# Patient Record
Sex: FEMALE | Race: WHITE | Employment: UNEMPLOYED | URBAN - METROPOLITAN AREA
[De-identification: names, ages, dates, MRNs, and addresses within clinical notes are randomized per-mention and may not be internally consistent; named-entity substitution may affect disease eponyms.]

---

## 2017-08-03 ENCOUNTER — APPOINTMENT (OUTPATIENT)
Dept: GENERAL RADIOLOGY | Age: 42
End: 2017-08-03
Attending: EMERGENCY MEDICINE
Payer: SELF-PAY

## 2017-08-03 ENCOUNTER — HOSPITAL ENCOUNTER (EMERGENCY)
Age: 42
Discharge: ANOTHER HOSPITAL | End: 2017-08-03
Attending: EMERGENCY MEDICINE
Payer: SELF-PAY

## 2017-08-03 ENCOUNTER — APPOINTMENT (OUTPATIENT)
Dept: CT IMAGING | Age: 42
End: 2017-08-03
Attending: EMERGENCY MEDICINE
Payer: SELF-PAY

## 2017-08-03 VITALS
DIASTOLIC BLOOD PRESSURE: 67 MMHG | HEART RATE: 110 BPM | SYSTOLIC BLOOD PRESSURE: 110 MMHG | HEIGHT: 66 IN | WEIGHT: 165 LBS | RESPIRATION RATE: 11 BRPM | OXYGEN SATURATION: 98 % | BODY MASS INDEX: 26.52 KG/M2

## 2017-08-03 DIAGNOSIS — R41.82 ALTERED MENTAL STATUS, UNSPECIFIED: ICD-10-CM

## 2017-08-03 DIAGNOSIS — I60.11 SUBARACHNOID HEMORRHAGE FROM ANEURYSM OF RIGHT MIDDLE CEREBRAL ARTERY (HCC): ICD-10-CM

## 2017-08-03 DIAGNOSIS — I60.9 SAH (SUBARACHNOID HEMORRHAGE) (HCC): Primary | ICD-10-CM

## 2017-08-03 LAB
ALBUMIN SERPL BCP-MCNC: 3.5 G/DL (ref 3.5–5)
ALBUMIN/GLOB SERPL: 1.1 {RATIO} (ref 1.2–3.5)
ALP SERPL-CCNC: 51 U/L (ref 50–136)
ALT SERPL-CCNC: 36 U/L (ref 12–65)
ANION GAP BLD CALC-SCNC: 8 MMOL/L (ref 7–16)
APTT PPP: 23.8 SEC (ref 23.5–31.7)
APTT PPP: 25.8 SEC (ref 23.5–31.7)
AST SERPL W P-5'-P-CCNC: 29 U/L (ref 15–37)
BILIRUB SERPL-MCNC: 0.3 MG/DL (ref 0.2–1.1)
BUN SERPL-MCNC: 8 MG/DL (ref 6–23)
CALCIUM SERPL-MCNC: 8.5 MG/DL (ref 8.3–10.4)
CHLORIDE SERPL-SCNC: 106 MMOL/L (ref 98–107)
CO2 SERPL-SCNC: 27 MMOL/L (ref 21–32)
CREAT SERPL-MCNC: 0.74 MG/DL (ref 0.6–1)
GLOBULIN SER CALC-MCNC: 3.2 G/DL (ref 2.3–3.5)
GLUCOSE SERPL-MCNC: 132 MG/DL (ref 65–100)
INR BLD: 1 (ref 0.9–1.2)
INR PPP: 1 (ref 0.9–1.2)
POTASSIUM SERPL-SCNC: 3 MMOL/L (ref 3.5–5.1)
PROT SERPL-MCNC: 6.7 G/DL (ref 6.3–8.2)
PROTHROMBIN TIME: 10.6 SEC (ref 9.6–12)
PT BLD: 12.5 SECS (ref 9.6–11.6)
SODIUM SERPL-SCNC: 141 MMOL/L (ref 136–145)

## 2017-08-03 PROCEDURE — 74011000258 HC RX REV CODE- 258: Performed by: EMERGENCY MEDICINE

## 2017-08-03 PROCEDURE — 80051 ELECTROLYTE PANEL: CPT

## 2017-08-03 PROCEDURE — 99285 EMERGENCY DEPT VISIT HI MDM: CPT | Performed by: EMERGENCY MEDICINE

## 2017-08-03 PROCEDURE — 80307 DRUG TEST PRSMV CHEM ANLYZR: CPT | Performed by: EMERGENCY MEDICINE

## 2017-08-03 PROCEDURE — 74011636320 HC RX REV CODE- 636/320: Performed by: EMERGENCY MEDICINE

## 2017-08-03 PROCEDURE — 96367 TX/PROPH/DG ADDL SEQ IV INF: CPT | Performed by: EMERGENCY MEDICINE

## 2017-08-03 PROCEDURE — 71010 XR CHEST SNGL V: CPT

## 2017-08-03 PROCEDURE — 85610 PROTHROMBIN TIME: CPT | Performed by: EMERGENCY MEDICINE

## 2017-08-03 PROCEDURE — 74011250636 HC RX REV CODE- 250/636: Performed by: EMERGENCY MEDICINE

## 2017-08-03 PROCEDURE — 84484 ASSAY OF TROPONIN QUANT: CPT

## 2017-08-03 PROCEDURE — 74011000250 HC RX REV CODE- 250: Performed by: EMERGENCY MEDICINE

## 2017-08-03 PROCEDURE — 70496 CT ANGIOGRAPHY HEAD: CPT

## 2017-08-03 PROCEDURE — 83605 ASSAY OF LACTIC ACID: CPT

## 2017-08-03 PROCEDURE — 96368 THER/DIAG CONCURRENT INF: CPT | Performed by: EMERGENCY MEDICINE

## 2017-08-03 PROCEDURE — 85610 PROTHROMBIN TIME: CPT

## 2017-08-03 PROCEDURE — 96366 THER/PROPH/DIAG IV INF ADDON: CPT | Performed by: EMERGENCY MEDICINE

## 2017-08-03 PROCEDURE — 96375 TX/PRO/DX INJ NEW DRUG ADDON: CPT | Performed by: EMERGENCY MEDICINE

## 2017-08-03 PROCEDURE — 80053 COMPREHEN METABOLIC PANEL: CPT | Performed by: EMERGENCY MEDICINE

## 2017-08-03 PROCEDURE — 31500 INSERT EMERGENCY AIRWAY: CPT | Performed by: EMERGENCY MEDICINE

## 2017-08-03 PROCEDURE — 36600 WITHDRAWAL OF ARTERIAL BLOOD: CPT

## 2017-08-03 PROCEDURE — 82803 BLOOD GASES ANY COMBINATION: CPT

## 2017-08-03 PROCEDURE — 70450 CT HEAD/BRAIN W/O DYE: CPT

## 2017-08-03 PROCEDURE — 85730 THROMBOPLASTIN TIME PARTIAL: CPT | Performed by: EMERGENCY MEDICINE

## 2017-08-03 PROCEDURE — 85025 COMPLETE CBC W/AUTO DIFF WBC: CPT | Performed by: EMERGENCY MEDICINE

## 2017-08-03 PROCEDURE — 94002 VENT MGMT INPAT INIT DAY: CPT

## 2017-08-03 PROCEDURE — 96365 THER/PROPH/DIAG IV INF INIT: CPT | Performed by: EMERGENCY MEDICINE

## 2017-08-03 PROCEDURE — 74011250637 HC RX REV CODE- 250/637: Performed by: EMERGENCY MEDICINE

## 2017-08-03 RX ORDER — ETOMIDATE 2 MG/ML
20 INJECTION INTRAVENOUS ONCE
Status: COMPLETED | OUTPATIENT
Start: 2017-08-03 | End: 2017-08-03

## 2017-08-03 RX ORDER — PROPOFOL 10 MG/ML
5-50 VIAL (ML) INTRAVENOUS
Status: DISCONTINUED | OUTPATIENT
Start: 2017-08-03 | End: 2017-08-03 | Stop reason: HOSPADM

## 2017-08-03 RX ORDER — NICARDIPINE HYDROCHLORIDE 0.1 MG/ML
5 INJECTION INTRAVENOUS
Status: DISCONTINUED | OUTPATIENT
Start: 2017-08-03 | End: 2017-08-03 | Stop reason: HOSPADM

## 2017-08-03 RX ORDER — MANNITOL 20 G/100ML
50 INJECTION, SOLUTION INTRAVENOUS ONCE
Status: COMPLETED | OUTPATIENT
Start: 2017-08-03 | End: 2017-08-03

## 2017-08-03 RX ORDER — SUCCINYLCHOLINE CHLORIDE 20 MG/ML
100 INJECTION INTRAMUSCULAR; INTRAVENOUS
Status: COMPLETED | OUTPATIENT
Start: 2017-08-03 | End: 2017-08-03

## 2017-08-03 RX ORDER — SODIUM CHLORIDE 0.9 % (FLUSH) 0.9 %
10 SYRINGE (ML) INJECTION
Status: COMPLETED | OUTPATIENT
Start: 2017-08-03 | End: 2017-08-03

## 2017-08-03 RX ORDER — FENTANYL CITRATE 50 UG/ML
50 INJECTION, SOLUTION INTRAMUSCULAR; INTRAVENOUS ONCE
Status: COMPLETED | OUTPATIENT
Start: 2017-08-03 | End: 2017-08-03

## 2017-08-03 RX ORDER — FENTANYL CITRATE 50 UG/ML
50 INJECTION, SOLUTION INTRAMUSCULAR; INTRAVENOUS ONCE
Status: DISCONTINUED | OUTPATIENT
Start: 2017-08-03 | End: 2017-08-03 | Stop reason: HOSPADM

## 2017-08-03 RX ORDER — DEXAMETHASONE SODIUM PHOSPHATE 100 MG/10ML
10 INJECTION INTRAMUSCULAR; INTRAVENOUS
Status: COMPLETED | OUTPATIENT
Start: 2017-08-03 | End: 2017-08-03

## 2017-08-03 RX ORDER — NIMODIPINE 30 MG/1
60 CAPSULE, LIQUID FILLED ORAL ONCE
Status: COMPLETED | OUTPATIENT
Start: 2017-08-03 | End: 2017-08-03

## 2017-08-03 RX ADMIN — NICARDIPINE HYDROCHLORIDE 5 MG/HR: 0.1 INJECTION, SOLUTION INTRAVENOUS at 16:52

## 2017-08-03 RX ADMIN — FENTANYL CITRATE 50 MCG: 50 INJECTION, SOLUTION INTRAMUSCULAR; INTRAVENOUS at 18:17

## 2017-08-03 RX ADMIN — IOPAMIDOL 80 ML: 755 INJECTION, SOLUTION INTRAVENOUS at 18:25

## 2017-08-03 RX ADMIN — NIMODIPINE 60 MG: 30 CAPSULE, LIQUID FILLED ORAL at 17:11

## 2017-08-03 RX ADMIN — LEVETIRACETAM 1000 MG: 100 INJECTION, SOLUTION INTRAVENOUS at 18:39

## 2017-08-03 RX ADMIN — MANNITOL 50 G: 20 INJECTION, SOLUTION INTRAVENOUS at 19:33

## 2017-08-03 RX ADMIN — SUCCINYLCHOLINE CHLORIDE 100 MG: 20 INJECTION, SOLUTION INTRAMUSCULAR; INTRAVENOUS at 16:52

## 2017-08-03 RX ADMIN — SODIUM CHLORIDE 100 ML: 900 INJECTION, SOLUTION INTRAVENOUS at 18:25

## 2017-08-03 RX ADMIN — ETOMIDATE 20 MG: 2 INJECTION, SOLUTION INTRAVENOUS at 16:53

## 2017-08-03 RX ADMIN — Medication 10 ML: at 18:26

## 2017-08-03 RX ADMIN — FENTANYL CITRATE 50 MCG: 50 INJECTION, SOLUTION INTRAMUSCULAR; INTRAVENOUS at 20:02

## 2017-08-03 RX ADMIN — DEXAMETHASONE SODIUM PHOSPHATE 10 MG: 10 INJECTION INTRAMUSCULAR; INTRAVENOUS at 17:22

## 2017-08-03 RX ADMIN — PROPOFOL 10 MCG/KG/MIN: 10 INJECTION, EMULSION INTRAVENOUS at 16:33

## 2017-08-03 NOTE — ED NOTES
Pt lifting arm, attempting to pull out tube. Restraints applied to upper extremities for medical purposes. Restraints not applied for behavior. Order per MD placed.

## 2017-08-03 NOTE — PROGRESS NOTES
Patient was intubated with a number 7.5 ET Tube. Tube placement verified by auscultation, by CXR and ETCO2 monitor. ET Tube is secured at the 23 cm sandhya at the lip and on the bilateral side. Patient was intubated by Dr. Leo Blake on the 1 attempt. Breath sounds are crackles. Patient is Negative for subcutaneous air and chest excursion is symmetric. Trachea is midline. Patient is also Negative for cyanosis and is Negative for pitting edema. Patient placed on ventilator on documented settings. All alarms are set and audible. Resuscitation bag is at the head of the bed. Ventilator Settings  Mode FIO2 Rate Tidal Volume Pressure PEEP I:E Ratio   PRVC  40 %    450 ml     8 cm H20  1:2      Peak airway pressure: 22 cm H2O   Minute ventilation: 12.2 l/min     ABG: No results for input(s): PH, PCO2, PO2, HCO3 in the last 72 hours.

## 2017-08-03 NOTE — ED NOTES
Report given to Patricia Landaverde RN, receiving nurse at [facility] ED at this time. Opportunity for questions and clarifications given. Pt to be transferred by [facility]. Pt remains on ventilator and IV infusions continuing on transfer.

## 2017-08-03 NOTE — ED NOTES
Toan Cody MD, RT and primary RN + 2 more RN at bedside   1621 meds given by Bridger Lopez rn   1622 CT called at this time by CEDRIC sam to clear table for CT scan   1623 pt intubated, positive color change on CO2 monitor.  Intubation successful at this time

## 2017-08-03 NOTE — ED TRIAGE NOTES
Pt arrived via EMS for syncopal episode. EMS states pt had seizure-like activity upon arrival that lasted ~30 seconds. EMS states left sided gaze after episode. Pt would only respond to painful stimuli. Non rebreather placed and pt woke up enough to tell EMS her first name. Pt given 5 mg versed by EMS for seizure like activity. VS from EMS /120, HR 80s, , pt has no medical history. Upon arrival, pt awake but not oriented to time, place, person. Pt could sit up and move around but did not follow commands. Pt then started to display agonal breathing and posturing. MD at bedside. Intubation initiated.

## 2017-08-03 NOTE — ED PROVIDER NOTES
HPI Comments: 28-year-old female with no known past medical history presents via EMS status post syncopal episode with witnessed seizure-like activity. Patient's daughter was present with her at the time of the syncopal episode. According to family, she told her daughter that she \"did not feel well and to call 911\" before she had syncopal episode. Patient reportedly had witnessed tonic clonic jerking of bilateral upper and lower extremities for 1 minute witnessed by EMS. Patient given Versed 5 mg IV with resolution of symptoms. On arrival, patient on nonrebreather with sats 100%. Patient found to be hypertensive with systolic blood pressure 000 w/ minimal responsiveness. Shortly thereafter, patient began having posturing of bilateral upper extremities. No witnessed seizure-like activity in ER. Patient is a 37 y.o. female presenting with altered mental status. The history is provided by the EMS personnel. The history is limited by the condition of the patient. Altered mental status    This is a new problem. The current episode started less than 1 hour ago. The problem has not changed since onset. Associated symptoms include confusion, seizures and unresponsiveness. Mental status baseline is normal.  Risk factors: uncontrolled HTN. Her past medical history is significant for hypertension. No past medical history on file. No past surgical history on file. No family history on file. Social History     Social History    Marital status: UNKNOWN     Spouse name: N/A    Number of children: N/A    Years of education: N/A     Occupational History    Not on file.      Social History Main Topics    Smoking status: Not on file    Smokeless tobacco: Not on file    Alcohol use Not on file    Drug use: Not on file    Sexual activity: Not on file     Other Topics Concern    Not on file     Social History Narrative         ALLERGIES: Review of patient's allergies indicates no known allergies. Review of Systems   Unable to perform ROS: Intubated   Neurological: Positive for seizures. Psychiatric/Behavioral: Positive for confusion. Vitals:    08/03/17 1839 08/03/17 1910 08/03/17 1915 08/03/17 1930   BP: 116/71 124/76 114/69 110/67   Pulse: (!) 110      Resp: 11      SpO2: 99% 95% 96% 98%   Weight:       Height:                Physical Exam   Constitutional:   Altered    HENT:   Head: Normocephalic and atraumatic. Eyes: Conjunctivae are normal. Pupils are equal, round, and reactive to light. Neck: No JVD present. No tracheal deviation present. Cardiovascular: Regular rhythm, normal heart sounds and intact distal pulses. Tachycardic    Pulmonary/Chest: She has no wheezes. She has no rales. NRB in place, coarse breath sounds bilaterally      Abdominal: Soft. Bowel sounds are normal. She exhibits no distension. There is no tenderness. Neurological: GCS eye subscore is 3. GCS verbal subscore is 2. GCS motor subscore is 2. Altered, Posturing bilateral UEs   Skin: Skin is warm and dry. Nursing note and vitals reviewed.        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 [facility]):      Amount and/or Complexity of Data Reviewed  Clinical lab tests: ordered and reviewed  Tests in the radiology section of CPT®: ordered and reviewed  Tests in the medicine section of CPT®: ordered and reviewed  Obtain history from someone other than the patient: yes  Review and summarize past medical records: yes  Discuss the patient with other providers: yes  Independent visualization of images, tracings, or specimens: yes    Risk of Complications, Morbidity, and/or Mortality  Presenting problems: high  Diagnostic procedures: high  Management options: high    Critical Care  Total time providing critical care: 30-74 minutes    Patient Progress  Patient progress: other (comment) (Guarded )    ED Course   Comment By Time   CT head w/o IV contrast w/ Diffuse subarachnoid hemorrhage with intraventricular extension, left more than right, with a small acute intraparenchymal hematoma in the medial anterior left temporal lobe. If this is spontaneous, would suspect an underlying ruptured arterial aneurysm. CTA or MRA of the brain may be helpful in further evaluation. NSGY immediately paged. Patient started immediately on Cardene drip upon arrival from [address] consulted. Request Mannitol IV, Decadron 10mg IV, Nimodipine 60mg NGT, Keppra 1g IV. Sandro Mcwilliams MD 08/03 6254   Family updated on findings. Sandro Mcwilliams MD 08/03 7172   NSGY consulted. Request ICU admission. Bear Mcgowan MD 08/03 3360   ICU consulted. State that patient will need to be transferred to [address] [place] [address] because they do not do endovascular repair. Sandro Mcwilliams MD 08/03 5100   Spoke with Dr. Julio Hansen who states that patient should be transferred to [address] [place] [address] at this time. SBP 120s on Cardene drip. Dr. Julio Hansen accepted transfer. EMTALA documentation completed. Several attempts made in contacting Dr. Isabel Perry in regards to transfer. Family updated and in agreement with plan. Bear Mcgowan MD 08/03 9313       Intubation  Date/Time: 8/3/2017 4:33 PM  Performed by: Alveria Harada  Authorized by: Ramiro Monae     Consent:     Consent obtained:  Emergent situation  Pre-procedure details:     Patient status:  Unresponsive    Mallampati score: I    Paralytics:  Succinylcholine  Procedure details:     Preoxygenation:  Bag valve mask    Intubation method:  Oral    Oral intubation technique:  Direct    Laryngoscope blade:   Mac 3    Tube size (mm):  7.5    Tube type:  Cuffed    Number of attempts:  1    Cricoid pressure: yes      Tube visualized through cords: yes    Placement assessment:     ETT to lip:  23     Tube secured with:  ETT morgan    Breath sounds:  Equal    Placement verification: chest rise, CXR verification, equal breath sounds and ETCO2 detector      CXR findings:  ETT in proper place  Post-procedure details:     Patient tolerance of procedure: Tolerated well, no immediate complications    EKG  Date/Time: 8/3/2017 8:18 PM  Performed by: Rik Da Silva Escort  Authorized by: Kaushik Moore     ECG reviewed by ED Physician in the absence of a cardiologist: yes    Rate:     ECG rate:  80    ECG rate assessment: normal    Rhythm:     Rhythm: sinus rhythm    Ectopy:     Ectopy: none    QRS:     QRS axis:  Normal    QRS intervals:  Normal  Conduction:     Conduction: normal    ST segments:     ST segments:  Normal  T waves:     T waves: normal            ===================================================================  This patient is critically ill and there is a high probability of of imminent or life threatening deterioration in the patient's condition without immediate management. The nature of the patient's clinical problem is: SAH    I have spent 1 hour in direct patient care, documentation, review of labs/xrays/old records, discussion with Family, Staff, Colleague, Nursing . The time involved in the performance of separately reportable procedures was not counted toward critical care time.      Kade Gandara MD; 8/3/2017 @7:26 PM  ===================================================================

## 2017-08-03 NOTE — ED NOTES
Lab contacted for the 3rd time about running labs for this patient. Lab informed first time about name change, reason for the completion of these labs, and the acuity of patient. Labs needed to be completed STAT due to the critical condition of pt. Labs still not completed at this time.

## 2017-08-04 LAB
ATRIAL RATE: 80 BPM
CALCULATED P AXIS, ECG09: 77 DEGREES
CALCULATED R AXIS, ECG10: 86 DEGREES
CALCULATED T AXIS, ECG11: 74 DEGREES
DIAGNOSIS, 93000: NORMAL
P-R INTERVAL, ECG05: 116 MS
Q-T INTERVAL, ECG07: 352 MS
QRS DURATION, ECG06: 86 MS
QTC CALCULATION (BEZET), ECG08: 405 MS
VENTRICULAR RATE, ECG03: 80 BPM

## 2017-08-04 NOTE — ED NOTES
Cardene stopped. Fentanyl given due to agitation. Cardene stopped due to last pressure drop with fentanyl. MD aware. Orders given.